# Patient Record
Sex: MALE | Race: WHITE | NOT HISPANIC OR LATINO | URBAN - NONMETROPOLITAN AREA
[De-identification: names, ages, dates, MRNs, and addresses within clinical notes are randomized per-mention and may not be internally consistent; named-entity substitution may affect disease eponyms.]

---

## 2023-08-18 ENCOUNTER — OFFICE VISIT (OUTPATIENT)
Dept: URGENT CARE | Facility: CLINIC | Age: 23
End: 2023-08-18
Payer: COMMERCIAL

## 2023-08-18 VITALS
DIASTOLIC BLOOD PRESSURE: 78 MMHG | OXYGEN SATURATION: 99 % | TEMPERATURE: 98.4 F | SYSTOLIC BLOOD PRESSURE: 132 MMHG | BODY MASS INDEX: 24.81 KG/M2 | RESPIRATION RATE: 14 BRPM | HEIGHT: 74 IN | WEIGHT: 193.3 LBS | HEART RATE: 66 BPM

## 2023-08-18 DIAGNOSIS — R10.30 LOWER ABDOMINAL PAIN: ICD-10-CM

## 2023-08-18 DIAGNOSIS — K64.8 BLEEDING INTERNAL HEMORRHOIDS: ICD-10-CM

## 2023-08-18 PROCEDURE — 3075F SYST BP GE 130 - 139MM HG: CPT | Performed by: NURSE PRACTITIONER

## 2023-08-18 PROCEDURE — 99203 OFFICE O/P NEW LOW 30 MIN: CPT | Performed by: NURSE PRACTITIONER

## 2023-08-18 PROCEDURE — 3078F DIAST BP <80 MM HG: CPT | Performed by: NURSE PRACTITIONER

## 2023-08-18 RX ORDER — HYDROCORTISONE ACETATE 25 MG/1
25 SUPPOSITORY RECTAL EVERY 12 HOURS
Qty: 14 SUPPOSITORY | Refills: 0 | Status: SHIPPED | OUTPATIENT
Start: 2023-08-18 | End: 2023-08-25

## 2023-08-21 ASSESSMENT — ENCOUNTER SYMPTOMS
NAUSEA: 0
FEVER: 0
BLOOD IN STOOL: 1
CHILLS: 0
CONSTIPATION: 0
HEARTBURN: 0
VOMITING: 0
ABDOMINAL PAIN: 0
BRUISES/BLEEDS EASILY: 0
DIARRHEA: 0

## 2023-08-21 NOTE — PROGRESS NOTES
"Subjective:     Jeremy Hernadez is a 23 y.o. male who presents for Bloody Stools (Blood in stool, gassy, upset stomach after eating, bloating x few days)      HPI  Pt presents for evaluation of a new problem.  Beto is a very pleasant 23-year-old male presents urgent care today with complaints of rectal bleeding that has been ongoing for the past day.  He states for the past few days he has been suffering from lower abdominal discomfort.  He denies any pain, nausea or vomiting.  Negative for diarrhea.  He does note deep red bloody discharge in the toilet as well as toilet paper today.  There has been no discomfort with bowel movement.     Review of Systems   Constitutional:  Negative for chills and fever.   Gastrointestinal:  Positive for blood in stool. Negative for abdominal pain, constipation, diarrhea, heartburn, melena, nausea and vomiting.   Endo/Heme/Allergies:  Does not bruise/bleed easily.       PMH: History reviewed. No pertinent past medical history.  ALLERGIES: No Known Allergies  SURGHX: History reviewed. No pertinent surgical history.  SOCHX:   Social History     Socioeconomic History    Marital status: Unknown   Tobacco Use    Smoking status: Never    Smokeless tobacco: Never   Substance and Sexual Activity    Alcohol use: Never    Drug use: Never     FH: History reviewed. No pertinent family history.      Objective:   /78 (BP Location: Right arm, Patient Position: Sitting, BP Cuff Size: Adult)   Pulse 66   Temp 36.9 °C (98.4 °F) (Temporal)   Resp 14   Ht 1.88 m (6' 2\")   Wt 87.7 kg (193 lb 4.8 oz)   SpO2 99%   BMI 24.82 kg/m²     Physical Exam  Vitals and nursing note reviewed.   Constitutional:       General: He is not in acute distress.     Appearance: Normal appearance. He is normal weight. He is not ill-appearing or toxic-appearing.   HENT:      Head: Normocephalic.      Right Ear: External ear normal.      Left Ear: External ear normal.      Nose: Nose normal.      Mouth/Throat:      " Mouth: Mucous membranes are moist.   Eyes:      General:         Right eye: No discharge.         Left eye: No discharge.      Extraocular Movements: Extraocular movements intact.      Conjunctiva/sclera: Conjunctivae normal.      Pupils: Pupils are equal, round, and reactive to light.   Pulmonary:      Effort: Pulmonary effort is normal.      Breath sounds: Normal breath sounds.   Abdominal:      General: Abdomen is flat.   Genitourinary:     Comments: Negative for external hemorrhoid.  Digital exam was performed and internal hemorrhoid was palpated.  Scant bloody drainage during exam.  Musculoskeletal:         General: Normal range of motion.      Cervical back: Normal range of motion and neck supple. No rigidity.   Lymphadenopathy:      Cervical: No cervical adenopathy.   Skin:     General: Skin is warm and dry.   Neurological:      General: No focal deficit present.      Mental Status: He is alert and oriented to person, place, and time. Mental status is at baseline.   Psychiatric:         Mood and Affect: Mood normal.         Behavior: Behavior normal.         Judgment: Judgment normal.         Assessment/Plan:   Assessment    1. Bleeding internal hemorrhoids  hydrocortisone (ANUSOL-HC) 25 MG Suppos      2. Lower abdominal pain  Referral to Gastroenterology        Differential diagnoses were discussed with patient.  I am concerned for upper due to deep red bloody discharge and complaints of abdominal discomfort.  However, internal hemorrhoid was palpated on exam it is very likely that this could be where his blood is coming from.  We will empirically start Anusol suppository for treatment and follow-up in the emergency room if symptoms worsen or persist.  He is in agreement with plan of care. AVS handout given and reviewed with patient. Pt educated on red flags and when to seek treatment back in ER or UC.